# Patient Record
Sex: FEMALE | Race: BLACK OR AFRICAN AMERICAN | NOT HISPANIC OR LATINO | URBAN - METROPOLITAN AREA
[De-identification: names, ages, dates, MRNs, and addresses within clinical notes are randomized per-mention and may not be internally consistent; named-entity substitution may affect disease eponyms.]

---

## 2020-10-30 ENCOUNTER — OFFICE VISIT (OUTPATIENT)
Dept: URBAN - METROPOLITAN AREA CLINIC 84 | Facility: CLINIC | Age: 70
End: 2020-10-30
Payer: COMMERCIAL

## 2020-10-30 DIAGNOSIS — K58.9 IBS (IRRITABLE BOWEL SYNDROME)-C: ICD-10-CM

## 2020-10-30 DIAGNOSIS — R13.10 DYSPHAGIA: ICD-10-CM

## 2020-10-30 DIAGNOSIS — K22.2 ESOPHAGEAL STRICTURE: ICD-10-CM

## 2020-10-30 PROCEDURE — G9903 PT SCRN TBCO ID AS NON USER: HCPCS | Performed by: INTERNAL MEDICINE

## 2020-10-30 PROCEDURE — G8427 DOCREV CUR MEDS BY ELIG CLIN: HCPCS | Performed by: INTERNAL MEDICINE

## 2020-10-30 PROCEDURE — G8417 CALC BMI ABV UP PARAM F/U: HCPCS | Performed by: INTERNAL MEDICINE

## 2020-10-30 PROCEDURE — 99214 OFFICE O/P EST MOD 30 MIN: CPT | Performed by: INTERNAL MEDICINE

## 2020-10-30 PROCEDURE — 3017F COLORECTAL CA SCREEN DOC REV: CPT | Performed by: INTERNAL MEDICINE

## 2020-10-30 PROCEDURE — G8483 FLU IMM NO ADMIN DOC REA: HCPCS | Performed by: INTERNAL MEDICINE

## 2020-10-30 RX ORDER — LATANOPROST/PF 0.005 %
INSTILL 1 DROP INTO AFFECTED EYE(S) BY OPHTHALMIC ROUTE ONCE DAILY IN THE EVENING DROPS OPHTHALMIC (EYE) 1
Qty: 1 | Refills: 0 | Status: ACTIVE | COMMUNITY
Start: 1900-01-01 | End: 1900-01-01

## 2020-10-30 RX ORDER — LINACLOTIDE 145 UG/1
1 CAPSULE AT LEAST 30 MINUTES BEFORE THE FIRST MEAL OF THE DAY ON AN EMPTY STOMACH CAPSULE, GELATIN COATED ORAL ONCE A DAY
Qty: 90 | Refills: 3 | OUTPATIENT
Start: 2020-10-30 | End: 2021-10-25

## 2020-10-30 RX ORDER — CHLORHEXIDINE GLUCONATE 4 %
LIQUID (ML) TOPICAL
Qty: 0 | Refills: 0 | Status: ACTIVE | COMMUNITY
Start: 1900-01-01 | End: 1900-01-01

## 2020-10-30 RX ORDER — POTASSIUM CHLORIDE 750 MG/1
TAKE 1 CAPSULE (10 MEQ) BY ORAL ROUTE ONCE DAILY WITH FOOD CAPSULE, EXTENDED RELEASE ORAL 1
Qty: 0 | Refills: 0 | Status: ACTIVE | COMMUNITY
Start: 1900-01-01 | End: 1900-01-01

## 2020-10-30 RX ORDER — ESCITALOPRAM 10 MG/1
TABLET, FILM COATED ORAL
Qty: 0 | Refills: 0 | Status: ACTIVE | COMMUNITY
Start: 1900-01-01 | End: 1900-01-01

## 2020-10-30 RX ORDER — PANTOPRAZOLE SODIUM 40 MG/1
TABLET, DELAYED RELEASE ORAL
Qty: 0 | Refills: 0 | Status: ACTIVE | COMMUNITY
Start: 1900-01-01 | End: 1900-01-01

## 2020-10-30 RX ORDER — DICLOFENAC SODIUM 1 MG/ML
SOLUTION OPHTHALMIC
Qty: 0 | Refills: 0 | Status: ACTIVE | COMMUNITY
Start: 1900-01-01 | End: 1900-01-01

## 2020-10-30 RX ORDER — NYSTATIN 100000 [USP'U]/G
CREAM TOPICAL
Qty: 0 | Refills: 0 | Status: ACTIVE | COMMUNITY
Start: 1900-01-01 | End: 1900-01-01

## 2020-10-30 NOTE — HPI-TODAY'S VISIT:
Patient last seen about 1.5 years ago.  There has been no change in her PMHx/PSHx.  She did have her PPM was changed to a PPM/AICD. Her Cardiologist is Dr. Ying.  She is not on any anticoagulation therapy.  She was feeling well until 3 months ago.  She developed dysphagia.  She had a lot of post prandial cough and "strangling."  She feels like it takes a long time for her pills and solid food to go down.  She has an epigastric pain when the food hits her stomach.  She has nausea but no vomiting on a regular basis.  She denies heartburn.  She is on Protonix .  She was seen by ENT for her sense of "strangling" sensation.  She saw Julia Fahrney, AuD.  According to the patient she had a  SLP evaluation.  According to the patient, she was told that her esophagus is narrow.  They were recommending an EGD.  She has a decreased appetite but denies weight loss.  She has 2 BM's a day.  She has a lot of cramping when she has a B M.  She has a lot of nausea and dizziness after a BM.  She denies LGI bleed or melena.  She has strain and incomplete evacuation.  She is on Metamucil and Miralax.  She has not been on Linzess/Amitiza/Trulance.  As far as she knows she does not have CHF

## 2020-11-03 PROBLEM — 63305008 ESOPHAGEAL STRICTURE: Status: ACTIVE | Noted: 2020-10-30

## 2020-11-18 ENCOUNTER — TELEPHONE ENCOUNTER (OUTPATIENT)
Dept: URBAN - METROPOLITAN AREA CLINIC 92 | Facility: CLINIC | Age: 70
End: 2020-11-18

## 2020-11-18 ENCOUNTER — CLAIMS CREATED FROM THE CLAIM WINDOW (OUTPATIENT)
Dept: URBAN - METROPOLITAN AREA CLINIC 4 | Facility: CLINIC | Age: 70
End: 2020-11-18
Payer: COMMERCIAL

## 2020-11-18 ENCOUNTER — OFFICE VISIT (OUTPATIENT)
Dept: URBAN - METROPOLITAN AREA SURGERY CENTER 20 | Facility: SURGERY CENTER | Age: 70
End: 2020-11-18
Payer: COMMERCIAL

## 2020-11-18 DIAGNOSIS — K21.00 GASTRO-ESOPHAGEAL REFLUX DISEASE WITH ESOPHAGITIS, WITHOUT BLEEDING: ICD-10-CM

## 2020-11-18 DIAGNOSIS — R13.10 ABNORMAL SWALLOWING: ICD-10-CM

## 2020-11-18 DIAGNOSIS — K21.9 ACID REFLUX: ICD-10-CM

## 2020-11-18 DIAGNOSIS — Z98.84 BARIATRIC SURG STAT-UNSP: ICD-10-CM

## 2020-11-18 DIAGNOSIS — K31.89 OTHER DISEASES OF STOMACH AND DUODENUM: ICD-10-CM

## 2020-11-18 PROCEDURE — 88312 SPECIAL STAINS GROUP 1: CPT | Performed by: PATHOLOGY

## 2020-11-18 PROCEDURE — G8907 PT DOC NO EVENTS ON DISCHARG: HCPCS | Performed by: INTERNAL MEDICINE

## 2020-11-18 PROCEDURE — 43239 EGD BIOPSY SINGLE/MULTIPLE: CPT | Performed by: INTERNAL MEDICINE

## 2020-11-18 PROCEDURE — 88305 TISSUE EXAM BY PATHOLOGIST: CPT | Performed by: PATHOLOGY

## 2020-11-18 RX ORDER — POTASSIUM CHLORIDE 750 MG/1
TAKE 1 CAPSULE (10 MEQ) BY ORAL ROUTE ONCE DAILY WITH FOOD CAPSULE, EXTENDED RELEASE ORAL 1
Qty: 0 | Refills: 0 | Status: ACTIVE | COMMUNITY
Start: 1900-01-01 | End: 1900-01-01

## 2020-11-18 RX ORDER — NYSTATIN 100000 [USP'U]/G
CREAM TOPICAL
Qty: 0 | Refills: 0 | Status: ACTIVE | COMMUNITY
Start: 1900-01-01 | End: 1900-01-01

## 2020-11-18 RX ORDER — ESCITALOPRAM 10 MG/1
TABLET, FILM COATED ORAL
Qty: 0 | Refills: 0 | Status: ACTIVE | COMMUNITY
Start: 1900-01-01 | End: 1900-01-01

## 2020-11-18 RX ORDER — PANTOPRAZOLE SODIUM 40 MG/1
TABLET, DELAYED RELEASE ORAL
Qty: 0 | Refills: 0 | Status: ACTIVE | COMMUNITY
Start: 1900-01-01 | End: 1900-01-01

## 2020-11-18 RX ORDER — CHLORHEXIDINE GLUCONATE 4 %
LIQUID (ML) TOPICAL
Qty: 0 | Refills: 0 | Status: ACTIVE | COMMUNITY
Start: 1900-01-01 | End: 1900-01-01

## 2020-11-18 RX ORDER — LINACLOTIDE 145 UG/1
1 CAPSULE AT LEAST 30 MINUTES BEFORE THE FIRST MEAL OF THE DAY ON AN EMPTY STOMACH CAPSULE, GELATIN COATED ORAL ONCE A DAY
Qty: 90 | Refills: 3 | Status: ACTIVE | COMMUNITY
Start: 2020-10-30 | End: 2021-10-25

## 2020-11-18 RX ORDER — DICLOFENAC SODIUM 1 MG/ML
SOLUTION OPHTHALMIC
Qty: 0 | Refills: 0 | Status: ACTIVE | COMMUNITY
Start: 1900-01-01 | End: 1900-01-01

## 2020-11-18 RX ORDER — LATANOPROST/PF 0.005 %
INSTILL 1 DROP INTO AFFECTED EYE(S) BY OPHTHALMIC ROUTE ONCE DAILY IN THE EVENING DROPS OPHTHALMIC (EYE) 1
Qty: 1 | Refills: 0 | Status: ACTIVE | COMMUNITY
Start: 1900-01-01 | End: 1900-01-01

## 2021-04-22 ENCOUNTER — OFFICE VISIT (OUTPATIENT)
Dept: URBAN - METROPOLITAN AREA CLINIC 84 | Facility: CLINIC | Age: 71
End: 2021-04-22

## 2022-03-16 ENCOUNTER — OFFICE VISIT (OUTPATIENT)
Dept: URBAN - METROPOLITAN AREA CLINIC 29 | Facility: CLINIC | Age: 72
End: 2022-03-16

## 2022-03-16 PROBLEM — 275978004 SCREENING FOR MALIGNANT NEOPLASM OF COLON: Status: ACTIVE | Noted: 2022-03-16

## 2022-03-16 PROBLEM — 14760008 CONSTIPATION: Status: ACTIVE | Noted: 2022-03-16

## 2022-03-16 PROBLEM — 79922009 EPIGASTRIC PAIN: Status: ACTIVE | Noted: 2022-03-16

## 2022-03-16 PROBLEM — 301717006 RIGHT UPPER QUADRANT PAIN: Status: ACTIVE | Noted: 2022-03-16

## 2022-04-06 ENCOUNTER — OFFICE VISIT (OUTPATIENT)
Dept: URBAN - METROPOLITAN AREA MEDICAL CENTER 17 | Facility: MEDICAL CENTER | Age: 72
End: 2022-04-06

## 2022-04-27 ENCOUNTER — OFFICE VISIT (OUTPATIENT)
Dept: URBAN - METROPOLITAN AREA MEDICAL CENTER 17 | Facility: MEDICAL CENTER | Age: 72
End: 2022-04-27

## 2022-04-27 ENCOUNTER — OUT OF OFFICE VISIT (OUTPATIENT)
Dept: URBAN - METROPOLITAN AREA MEDICAL CENTER 17 | Facility: MEDICAL CENTER | Age: 72
End: 2022-04-27
Payer: COMMERCIAL

## 2022-04-27 DIAGNOSIS — Z98.84 BARIATRIC SURG STAT-UNSP: ICD-10-CM

## 2022-04-27 DIAGNOSIS — R10.13 ABDOMINAL DISCOMFORT, EPIGASTRIC: ICD-10-CM

## 2022-04-27 PROCEDURE — 43239 EGD BIOPSY SINGLE/MULTIPLE: CPT | Performed by: INTERNAL MEDICINE

## 2022-04-30 ENCOUNTER — TELEPHONE ENCOUNTER (OUTPATIENT)
Dept: URBAN - METROPOLITAN AREA CLINIC 121 | Facility: CLINIC | Age: 72
End: 2022-04-30

## 2022-05-01 ENCOUNTER — TELEPHONE ENCOUNTER (OUTPATIENT)
Dept: URBAN - METROPOLITAN AREA CLINIC 121 | Facility: CLINIC | Age: 72
End: 2022-05-01

## 2022-05-01 RX ORDER — SUCRALFATE 1 G/1
TAKE 1 TABLET BY MOUTH FOUR TIMES A DAY WITH A GLASS OF WATER DISSOLVE ONE TAB IN 10ML OF WATER BEFORE MEALS AND DRINK TABLET ORAL
Status: ACTIVE | COMMUNITY
Start: 2022-04-27

## 2022-05-01 RX ORDER — SODIUM SULFATE, POTASSIUM SULFATE, MAGNESIUM SULFATE 17.5; 3.13; 1.6 G/ML; G/ML; G/ML
TAKE AS DIRECTED MIX AND DRINK AS DIRECTED FOR COLONOSCOPY PREP SOLUTION, CONCENTRATE ORAL
Status: ACTIVE | COMMUNITY
Start: 2022-03-24

## 2022-05-16 ENCOUNTER — OFFICE VISIT (OUTPATIENT)
Dept: URBAN - METROPOLITAN AREA CLINIC 29 | Facility: CLINIC | Age: 72
End: 2022-05-16
Payer: COMMERCIAL

## 2022-05-16 VITALS
WEIGHT: 254 LBS | BODY MASS INDEX: 43.36 KG/M2 | TEMPERATURE: 97.5 F | HEIGHT: 64 IN | DIASTOLIC BLOOD PRESSURE: 78 MMHG | SYSTOLIC BLOOD PRESSURE: 140 MMHG | HEART RATE: 60 BPM

## 2022-05-16 DIAGNOSIS — R11.0 NAUSEA: ICD-10-CM

## 2022-05-16 PROCEDURE — 99213 OFFICE O/P EST LOW 20 MIN: CPT | Performed by: INTERNAL MEDICINE

## 2022-05-16 RX ORDER — ESCITALOPRAM 10 MG/1
TABLET, FILM COATED ORAL
Qty: 0 | Refills: 0 | Status: ACTIVE | COMMUNITY
Start: 1900-01-01 | End: 1900-01-01

## 2022-05-16 RX ORDER — CHLORHEXIDINE GLUCONATE 4 %
LIQUID (ML) TOPICAL
Qty: 0 | Refills: 0 | Status: ACTIVE | COMMUNITY
Start: 1900-01-01 | End: 1900-01-01

## 2022-05-16 RX ORDER — LATANOPROST/PF 0.005 %
INSTILL 1 DROP INTO AFFECTED EYE(S) BY OPHTHALMIC ROUTE ONCE DAILY IN THE EVENING DROPS OPHTHALMIC (EYE) 1
Qty: 1 | Refills: 0 | Status: ACTIVE | COMMUNITY
Start: 1900-01-01 | End: 1900-01-01

## 2022-05-16 RX ORDER — DICLOFENAC SODIUM 1 MG/ML
SOLUTION OPHTHALMIC
Qty: 0 | Refills: 0 | Status: ACTIVE | COMMUNITY
Start: 1900-01-01 | End: 1900-01-01

## 2022-05-16 RX ORDER — SUCRALFATE 1 G/1
TAKE 1 TABLET BY MOUTH FOUR TIMES A DAY WITH A GLASS OF WATER DISSOLVE ONE TAB IN 10ML OF WATER BEFORE MEALS AND DRINK TABLET ORAL
Status: ACTIVE | COMMUNITY
Start: 2022-04-27

## 2022-05-16 RX ORDER — PANTOPRAZOLE SODIUM 40 MG/1
TABLET, DELAYED RELEASE ORAL
Qty: 0 | Refills: 0 | Status: ACTIVE | COMMUNITY
Start: 1900-01-01 | End: 1900-01-01

## 2022-05-16 RX ORDER — SODIUM SULFATE, POTASSIUM SULFATE, MAGNESIUM SULFATE 17.5; 3.13; 1.6 G/ML; G/ML; G/ML
TAKE AS DIRECTED MIX AND DRINK AS DIRECTED FOR COLONOSCOPY PREP SOLUTION, CONCENTRATE ORAL
Status: ACTIVE | COMMUNITY
Start: 2022-03-24

## 2022-05-16 RX ORDER — NYSTATIN 100000 [USP'U]/G
CREAM TOPICAL
Qty: 0 | Refills: 0 | Status: ACTIVE | COMMUNITY
Start: 1900-01-01 | End: 1900-01-01

## 2022-05-16 RX ORDER — POTASSIUM CHLORIDE 750 MG/1
TAKE 1 CAPSULE (10 MEQ) BY ORAL ROUTE ONCE DAILY WITH FOOD CAPSULE, EXTENDED RELEASE ORAL 1
Qty: 0 | Refills: 0 | Status: ACTIVE | COMMUNITY
Start: 1900-01-01 | End: 1900-01-01

## 2022-05-16 NOTE — HPI-TODAY'S VISIT:
Ms. Fisher is a 71 y/o W with chronic dyspepsia,  She states that the omeprazole had improved her symptoms, but she still has nausea.  She states it's about 6/10.   She states she takes Carafate twice a day.

## 2022-08-12 ENCOUNTER — WEB ENCOUNTER (OUTPATIENT)
Dept: URBAN - METROPOLITAN AREA CLINIC 84 | Facility: CLINIC | Age: 72
End: 2022-08-12

## 2022-08-12 ENCOUNTER — OFFICE VISIT (OUTPATIENT)
Dept: URBAN - METROPOLITAN AREA CLINIC 84 | Facility: CLINIC | Age: 72
End: 2022-08-12
Payer: COMMERCIAL

## 2022-08-12 VITALS
SYSTOLIC BLOOD PRESSURE: 101 MMHG | HEIGHT: 64 IN | BODY MASS INDEX: 41.35 KG/M2 | DIASTOLIC BLOOD PRESSURE: 64 MMHG | TEMPERATURE: 97.9 F | WEIGHT: 242.2 LBS | HEART RATE: 64 BPM

## 2022-08-12 DIAGNOSIS — K58.9 IBS (IRRITABLE BOWEL SYNDROME)-C: ICD-10-CM

## 2022-08-12 DIAGNOSIS — R10.13 EPIGASTRIC ABDOMINAL PAIN: ICD-10-CM

## 2022-08-12 DIAGNOSIS — R11.0 NAUSEA: ICD-10-CM

## 2022-08-12 PROCEDURE — 99213 OFFICE O/P EST LOW 20 MIN: CPT | Performed by: INTERNAL MEDICINE

## 2022-08-12 RX ORDER — LATANOPROST/PF 0.005 %
INSTILL 1 DROP INTO AFFECTED EYE(S) BY OPHTHALMIC ROUTE ONCE DAILY IN THE EVENING DROPS OPHTHALMIC (EYE) 1
Qty: 1 | Refills: 0 | Status: ACTIVE | COMMUNITY
Start: 1900-01-01

## 2022-08-12 RX ORDER — PANTOPRAZOLE SODIUM 40 MG/1
TABLET, DELAYED RELEASE ORAL
Qty: 0 | Refills: 0 | Status: ACTIVE | COMMUNITY
Start: 1900-01-01

## 2022-08-12 RX ORDER — DICLOFENAC SODIUM 1 MG/ML
SOLUTION OPHTHALMIC
Qty: 0 | Refills: 0 | Status: ACTIVE | COMMUNITY
Start: 1900-01-01

## 2022-08-12 RX ORDER — CHLORHEXIDINE GLUCONATE 4 %
LIQUID (ML) TOPICAL
Qty: 0 | Refills: 0 | Status: ACTIVE | COMMUNITY
Start: 1900-01-01

## 2022-08-12 RX ORDER — SUCRALFATE 1 G/1
TAKE 1 TABLET BY MOUTH FOUR TIMES A DAY WITH A GLASS OF WATER DISSOLVE ONE TAB IN 10ML OF WATER BEFORE MEALS AND DRINK TABLET ORAL
Status: ACTIVE | COMMUNITY
Start: 2022-04-27

## 2022-08-12 RX ORDER — ESCITALOPRAM 10 MG/1
TABLET, FILM COATED ORAL
Qty: 0 | Refills: 0 | Status: ACTIVE | COMMUNITY
Start: 1900-01-01

## 2022-08-12 RX ORDER — PROMETHAZINE HYDROCHLORIDE 25 MG/1
1 TABLET AS NEEDED TABLET ORAL
Qty: 40 | Refills: 2 | OUTPATIENT
Start: 2022-08-12 | End: 2022-11-09

## 2022-08-12 RX ORDER — SODIUM SULFATE, POTASSIUM SULFATE, MAGNESIUM SULFATE 17.5; 3.13; 1.6 G/ML; G/ML; G/ML
TAKE AS DIRECTED MIX AND DRINK AS DIRECTED FOR COLONOSCOPY PREP SOLUTION, CONCENTRATE ORAL
Status: ACTIVE | COMMUNITY
Start: 2022-03-24

## 2022-08-12 RX ORDER — POTASSIUM CHLORIDE 750 MG/1
TAKE 1 CAPSULE (10 MEQ) BY ORAL ROUTE ONCE DAILY WITH FOOD CAPSULE, EXTENDED RELEASE ORAL 1
Qty: 0 | Refills: 0 | Status: ACTIVE | COMMUNITY
Start: 1900-01-01

## 2022-08-12 RX ORDER — NYSTATIN 100000 [USP'U]/G
CREAM TOPICAL
Qty: 0 | Refills: 0 | Status: ACTIVE | COMMUNITY
Start: 1900-01-01

## 2022-08-12 NOTE — HPI-TODAY'S VISIT:
Walter last seen by me in 2020.  EGD was overall nrmal.  She developed abdominal paina dn saw Dr. Gonzales.  EGD ahd gastritis and colonocsoph had multiple small TA's with a 3 year recall.  She was started on Omeprazole without relief.  She has constant N/V and an upper abdominal pain.  She has been having the symptoms for a few months.  She has tried to change her diet.  She thinks that she had a CT Scan.  She has a poor appetite and she ahs been losing weight.  She denies GERD.  She does have a vague chest pain.  She saw Dr. Ying from Cardiology last week.  She denies dysphagia.  She has nausea even if she doesn't eat.  She does feel a little better with Carafate.  She did not realy respond to Zofran.  She has been having regular BM's.  She uses Miralax and eats prunes.  She denies LGI bleed or melena.

## 2022-08-15 ENCOUNTER — TELEPHONE ENCOUNTER (OUTPATIENT)
Dept: URBAN - METROPOLITAN AREA CLINIC 84 | Facility: CLINIC | Age: 72
End: 2022-08-15

## 2022-08-18 ENCOUNTER — TELEPHONE ENCOUNTER (OUTPATIENT)
Dept: URBAN - METROPOLITAN AREA CLINIC 84 | Facility: CLINIC | Age: 72
End: 2022-08-18

## 2022-08-25 ENCOUNTER — OFFICE VISIT (OUTPATIENT)
Dept: URBAN - METROPOLITAN AREA CLINIC 84 | Facility: CLINIC | Age: 72
End: 2022-08-25

## 2022-10-07 ENCOUNTER — OFFICE VISIT (OUTPATIENT)
Dept: URBAN - METROPOLITAN AREA CLINIC 84 | Facility: CLINIC | Age: 72
End: 2022-10-07
Payer: COMMERCIAL

## 2022-10-07 VITALS
SYSTOLIC BLOOD PRESSURE: 112 MMHG | TEMPERATURE: 96.6 F | WEIGHT: 245.2 LBS | HEIGHT: 64 IN | BODY MASS INDEX: 41.86 KG/M2 | DIASTOLIC BLOOD PRESSURE: 68 MMHG | HEART RATE: 60 BPM

## 2022-10-07 DIAGNOSIS — R13.10 DYSPHAGIA: ICD-10-CM

## 2022-10-07 DIAGNOSIS — R10.13 EPIGASTRIC ABDOMINAL PAIN: ICD-10-CM

## 2022-10-07 DIAGNOSIS — K21.00 GASTRO-ESOPHAGEAL REFLUX DISEASE WITH ESOPHAGITIS, WITHOUT BLEEDING: ICD-10-CM

## 2022-10-07 DIAGNOSIS — R11.0 NAUSEA: ICD-10-CM

## 2022-10-07 DIAGNOSIS — K62.9 DISEASE OF ANUS AND RECTUM, UNSPECIFIED: ICD-10-CM

## 2022-10-07 DIAGNOSIS — K58.9 IBS (IRRITABLE BOWEL SYNDROME)-C: ICD-10-CM

## 2022-10-07 DIAGNOSIS — R68.81 EARLY SATIETY: ICD-10-CM

## 2022-10-07 DIAGNOSIS — R15.9 FULL INCONTINENCE OF FECES: ICD-10-CM

## 2022-10-07 PROCEDURE — 99213 OFFICE O/P EST LOW 20 MIN: CPT | Performed by: INTERNAL MEDICINE

## 2022-10-07 RX ORDER — SODIUM SULFATE, POTASSIUM SULFATE, MAGNESIUM SULFATE 17.5; 3.13; 1.6 G/ML; G/ML; G/ML
TAKE AS DIRECTED MIX AND DRINK AS DIRECTED FOR COLONOSCOPY PREP SOLUTION, CONCENTRATE ORAL
Status: ON HOLD | COMMUNITY
Start: 2022-03-24

## 2022-10-07 RX ORDER — PANTOPRAZOLE SODIUM 40 MG/1
TABLET, DELAYED RELEASE ORAL
Qty: 0 | Refills: 0 | Status: ACTIVE | COMMUNITY
Start: 1900-01-01

## 2022-10-07 RX ORDER — CHLORHEXIDINE GLUCONATE 4 %
LIQUID (ML) TOPICAL
Qty: 0 | Refills: 0 | Status: ON HOLD | COMMUNITY
Start: 1900-01-01

## 2022-10-07 RX ORDER — ESCITALOPRAM 10 MG/1
TABLET, FILM COATED ORAL
Qty: 0 | Refills: 0 | Status: ON HOLD | COMMUNITY
Start: 1900-01-01

## 2022-10-07 RX ORDER — SUCRALFATE 1 G/1
TAKE 1 TABLET BY MOUTH FOUR TIMES A DAY WITH A GLASS OF WATER DISSOLVE ONE TAB IN 10ML OF WATER BEFORE MEALS AND DRINK TABLET ORAL
Status: ACTIVE | COMMUNITY
Start: 2022-04-27

## 2022-10-07 RX ORDER — LATANOPROST/PF 0.005 %
INSTILL 1 DROP INTO AFFECTED EYE(S) BY OPHTHALMIC ROUTE ONCE DAILY IN THE EVENING DROPS OPHTHALMIC (EYE) 1
Qty: 1 | Refills: 0 | Status: ACTIVE | COMMUNITY
Start: 1900-01-01

## 2022-10-07 RX ORDER — PROMETHAZINE HYDROCHLORIDE 25 MG/1
1 TABLET AS NEEDED TABLET ORAL
Qty: 40 | Refills: 2 | Status: ON HOLD | COMMUNITY
Start: 2022-08-12 | End: 2022-11-09

## 2022-10-07 RX ORDER — POTASSIUM CHLORIDE 750 MG/1
TAKE 1 CAPSULE (10 MEQ) BY ORAL ROUTE ONCE DAILY WITH FOOD CAPSULE, EXTENDED RELEASE ORAL 1
Qty: 0 | Refills: 0 | Status: ACTIVE | COMMUNITY
Start: 1900-01-01

## 2022-10-07 RX ORDER — DICLOFENAC SODIUM 1 MG/ML
SOLUTION OPHTHALMIC
Qty: 0 | Refills: 0 | Status: ACTIVE | COMMUNITY
Start: 1900-01-01

## 2022-10-07 RX ORDER — NYSTATIN 100000 [USP'U]/G
CREAM TOPICAL
Qty: 0 | Refills: 0 | Status: ON HOLD | COMMUNITY
Start: 1900-01-01

## 2022-10-07 NOTE — HPI-TODAY'S VISIT:
SBFT was normal.  She still has the nausea.  It is controlled on the Phenergan PRN.  She spoke to her [place] doctors about her polypharmacy but they haven't made any changes.  She did stop her seizure medication.  She has occasional abdominal pain.  Her appetite is OK.  SHe denies weight loss.  She denies vomiting.  She has early satiety.  She sometimes feels like her pills gets stuck in her throat.  She has irregular BM's.  SHe has ahd some fecal seepage.  SHe denies LGI Bleed or melena

## 2022-11-04 ENCOUNTER — OFFICE VISIT (OUTPATIENT)
Dept: URBAN - METROPOLITAN AREA CLINIC 84 | Facility: CLINIC | Age: 72
End: 2022-11-04
Payer: COMMERCIAL

## 2022-11-04 VITALS
DIASTOLIC BLOOD PRESSURE: 76 MMHG | BODY MASS INDEX: 40.36 KG/M2 | WEIGHT: 236.4 LBS | TEMPERATURE: 97.3 F | SYSTOLIC BLOOD PRESSURE: 119 MMHG | HEIGHT: 64 IN | HEART RATE: 60 BPM

## 2022-11-04 DIAGNOSIS — R11.0 NAUSEA: ICD-10-CM

## 2022-11-04 DIAGNOSIS — K58.9 IBS (IRRITABLE BOWEL SYNDROME)-C: ICD-10-CM

## 2022-11-04 DIAGNOSIS — K21.00 GASTRO-ESOPHAGEAL REFLUX DISEASE WITH ESOPHAGITIS, WITHOUT BLEEDING: ICD-10-CM

## 2022-11-04 DIAGNOSIS — K31.84 GASTROPARESIS: ICD-10-CM

## 2022-11-04 PROCEDURE — 99213 OFFICE O/P EST LOW 20 MIN: CPT | Performed by: INTERNAL MEDICINE

## 2022-11-04 RX ORDER — DICLOFENAC SODIUM 1 MG/ML
SOLUTION OPHTHALMIC
Qty: 0 | Refills: 0 | Status: ACTIVE | COMMUNITY
Start: 1900-01-01

## 2022-11-04 RX ORDER — CHLORHEXIDINE GLUCONATE 4 %
LIQUID (ML) TOPICAL
Qty: 0 | Refills: 0 | Status: ON HOLD | COMMUNITY
Start: 1900-01-01

## 2022-11-04 RX ORDER — SUCRALFATE 1 G/1
TAKE 1 TABLET BY MOUTH FOUR TIMES A DAY WITH A GLASS OF WATER DISSOLVE ONE TAB IN 10ML OF WATER BEFORE MEALS AND DRINK TABLET ORAL
Status: ACTIVE | COMMUNITY
Start: 2022-04-27

## 2022-11-04 RX ORDER — PROMETHAZINE HYDROCHLORIDE 25 MG/1
1 TABLET AS NEEDED TABLET ORAL
Qty: 40 | Refills: 2 | Status: ON HOLD | COMMUNITY
Start: 2022-08-12 | End: 2022-11-09

## 2022-11-04 RX ORDER — LATANOPROST/PF 0.005 %
INSTILL 1 DROP INTO AFFECTED EYE(S) BY OPHTHALMIC ROUTE ONCE DAILY IN THE EVENING DROPS OPHTHALMIC (EYE) 1
Qty: 1 | Refills: 0 | Status: ACTIVE | COMMUNITY
Start: 1900-01-01

## 2022-11-04 RX ORDER — ESCITALOPRAM 10 MG/1
TABLET, FILM COATED ORAL
Qty: 0 | Refills: 0 | Status: ON HOLD | COMMUNITY
Start: 1900-01-01

## 2022-11-04 RX ORDER — PANTOPRAZOLE SODIUM 40 MG/1
TABLET, DELAYED RELEASE ORAL
Qty: 0 | Refills: 0 | Status: ACTIVE | COMMUNITY
Start: 1900-01-01

## 2022-11-04 RX ORDER — POTASSIUM CHLORIDE 750 MG/1
TAKE 1 CAPSULE (10 MEQ) BY ORAL ROUTE ONCE DAILY WITH FOOD CAPSULE, EXTENDED RELEASE ORAL 1
Qty: 0 | Refills: 0 | Status: ACTIVE | COMMUNITY
Start: 1900-01-01

## 2022-11-04 RX ORDER — PROMETHAZINE HYDROCHLORIDE 25 MG/1
1 TABLET AS NEEDED TABLET ORAL
Qty: 60 | Refills: 5

## 2022-11-04 RX ORDER — SODIUM SULFATE, POTASSIUM SULFATE, MAGNESIUM SULFATE 17.5; 3.13; 1.6 G/ML; G/ML; G/ML
TAKE AS DIRECTED MIX AND DRINK AS DIRECTED FOR COLONOSCOPY PREP SOLUTION, CONCENTRATE ORAL
Status: ON HOLD | COMMUNITY
Start: 2022-03-24

## 2022-11-04 RX ORDER — NYSTATIN 100000 [USP'U]/G
CREAM TOPICAL
Qty: 0 | Refills: 0 | Status: ON HOLD | COMMUNITY
Start: 1900-01-01

## 2022-11-04 NOTE — HPI-TODAY'S VISIT:
Patient seen a month ago.  GES was done and I am awaiting results.  She feels a little better. She has changed her diet and she believes that this has been helpful.  She has a good appetite.  She does have some nausea after she eats.  She spoke to her doctors about the polypharmacy.  She uses Phenergan twice a day with good response.  She has mild abdominal pain.  She has early satiety.  She denies GERD or dysphagia.  She denies LGI symptoms.  She has ahd some fecal seepage.  She has been doing kegels.  REview of GES from 10/22 reveals delayed agstric emptying c/w gastroparesis.  Her DM has been well controlled.  She is not on narcotics.  She is on Tramadol

## 2022-11-14 PROBLEM — 235675006 GASTROPARESIS: Status: ACTIVE | Noted: 2022-11-04

## 2022-11-14 PROBLEM — 10743008 IRRITABLE BOWEL SYNDROME: Status: ACTIVE | Noted: 2020-10-30

## 2022-11-14 PROBLEM — 1086911000119107: Status: ACTIVE | Noted: 2022-10-07

## 2022-11-14 PROBLEM — 422587007 NAUSEA: Status: ACTIVE | Noted: 2022-03-16

## 2023-05-05 ENCOUNTER — OFFICE VISIT (OUTPATIENT)
Dept: URBAN - METROPOLITAN AREA CLINIC 84 | Facility: CLINIC | Age: 73
End: 2023-05-05

## 2023-05-05 ENCOUNTER — OFFICE VISIT (OUTPATIENT)
Dept: URBAN - METROPOLITAN AREA CLINIC 84 | Facility: CLINIC | Age: 73
End: 2023-05-05
Payer: COMMERCIAL

## 2023-05-05 VITALS
BODY MASS INDEX: 41.21 KG/M2 | TEMPERATURE: 98 F | DIASTOLIC BLOOD PRESSURE: 81 MMHG | HEIGHT: 64 IN | SYSTOLIC BLOOD PRESSURE: 158 MMHG | HEART RATE: 64 BPM | WEIGHT: 241.4 LBS

## 2023-05-05 DIAGNOSIS — R11.0 NAUSEA: ICD-10-CM

## 2023-05-05 DIAGNOSIS — K31.84 GASTROPARESIS: ICD-10-CM

## 2023-05-05 DIAGNOSIS — R15.9 FULL INCONTINENCE OF FECES: ICD-10-CM

## 2023-05-05 DIAGNOSIS — K58.9 IBS (IRRITABLE BOWEL SYNDROME)-C: ICD-10-CM

## 2023-05-05 PROCEDURE — 99213 OFFICE O/P EST LOW 20 MIN: CPT | Performed by: INTERNAL MEDICINE

## 2023-05-05 RX ORDER — PANTOPRAZOLE SODIUM 40 MG/1
TABLET, DELAYED RELEASE ORAL
Qty: 0 | Refills: 0 | COMMUNITY
Start: 1900-01-01

## 2023-05-05 RX ORDER — LATANOPROST/PF 0.005 %
INSTILL 1 DROP INTO AFFECTED EYE(S) BY OPHTHALMIC ROUTE ONCE DAILY IN THE EVENING DROPS OPHTHALMIC (EYE) 1
Qty: 1 | Refills: 0 | COMMUNITY
Start: 1900-01-01

## 2023-05-05 RX ORDER — NYSTATIN 100000 [USP'U]/G
CREAM TOPICAL
Qty: 0 | Refills: 0 | COMMUNITY
Start: 1900-01-01

## 2023-05-05 RX ORDER — ESCITALOPRAM 10 MG/1
TABLET, FILM COATED ORAL
Qty: 0 | Refills: 0 | COMMUNITY
Start: 1900-01-01

## 2023-05-05 RX ORDER — DICLOFENAC SODIUM 1 MG/ML
SOLUTION OPHTHALMIC
Qty: 0 | Refills: 0 | COMMUNITY
Start: 1900-01-01

## 2023-05-05 RX ORDER — POTASSIUM CHLORIDE 750 MG/1
TAKE 1 CAPSULE (10 MEQ) BY ORAL ROUTE ONCE DAILY WITH FOOD CAPSULE, EXTENDED RELEASE ORAL 1
Qty: 0 | Refills: 0 | COMMUNITY
Start: 1900-01-01

## 2023-05-05 RX ORDER — SUCRALFATE 1 G/1
TAKE 1 TABLET BY MOUTH FOUR TIMES A DAY WITH A GLASS OF WATER DISSOLVE ONE TAB IN 10ML OF WATER BEFORE MEALS AND DRINK TABLET ORAL
COMMUNITY
Start: 2022-04-27

## 2023-05-05 RX ORDER — CHLORHEXIDINE GLUCONATE 4 %
LIQUID (ML) TOPICAL
Qty: 0 | Refills: 0 | COMMUNITY
Start: 1900-01-01

## 2023-05-05 RX ORDER — PROMETHAZINE HYDROCHLORIDE 25 MG/1
1 TABLET AS NEEDED TABLET ORAL
Qty: 60 | Refills: 5
Start: 2022-08-12 | End: 2023-08-03

## 2023-05-05 RX ORDER — SODIUM SULFATE, POTASSIUM SULFATE, MAGNESIUM SULFATE 17.5; 3.13; 1.6 G/ML; G/ML; G/ML
TAKE AS DIRECTED MIX AND DRINK AS DIRECTED FOR COLONOSCOPY PREP SOLUTION, CONCENTRATE ORAL
COMMUNITY
Start: 2022-03-24

## 2023-05-05 RX ORDER — PROMETHAZINE HYDROCHLORIDE 25 MG/1
1 TABLET AS NEEDED TABLET ORAL
Qty: 60 | Refills: 5 | COMMUNITY

## 2023-05-05 NOTE — HPI-TODAY'S VISIT:
Patient seen about 6 months ago.  Overall she is doing well.  She uses Phenergan PRN about 2-3 times a week.  It is very helpful.  She takes it for her nausea.  She denies significant abdominal pain.  She ahs been adhering to the gastroappparesis diet.  She has early satiety.  Her appetite is good and she denies weight loss.  She does try to exercise a lot.  She does have a lot of fatigue.  She has intermittent constipation especially when she eats starch and cheese.  She has been doing kegels and she has not had any recurrent FI.  She denies LGI bleed or melena

## 2023-11-03 ENCOUNTER — P2P PATIENT RECORD (OUTPATIENT)
Age: 73
End: 2023-11-03

## 2023-11-04 ENCOUNTER — P2P PATIENT RECORD (OUTPATIENT)
Age: 73
End: 2023-11-04

## 2024-03-28 ENCOUNTER — OV EP (OUTPATIENT)
Dept: URBAN - METROPOLITAN AREA CLINIC 84 | Facility: CLINIC | Age: 74
End: 2024-03-28

## 2024-03-29 ENCOUNTER — OV EP (OUTPATIENT)
Dept: URBAN - METROPOLITAN AREA CLINIC 84 | Facility: CLINIC | Age: 74
End: 2024-03-29
Payer: COMMERCIAL

## 2024-03-29 VITALS
BODY MASS INDEX: 42.3 KG/M2 | HEART RATE: 60 BPM | WEIGHT: 247.8 LBS | HEIGHT: 64 IN | TEMPERATURE: 97.3 F | SYSTOLIC BLOOD PRESSURE: 112 MMHG | DIASTOLIC BLOOD PRESSURE: 69 MMHG

## 2024-03-29 DIAGNOSIS — Z86.010 PERSONAL HISTORY OF COLON POLYPS: ICD-10-CM

## 2024-03-29 DIAGNOSIS — R15.9 FULL INCONTINENCE OF FECES: ICD-10-CM

## 2024-03-29 DIAGNOSIS — Z95.810 STATUS POST IMPLANTATION OF AUTOMATIC CARDIOVERTER/DEFIBRILLATOR (AICD): ICD-10-CM

## 2024-03-29 DIAGNOSIS — R11.0 NAUSEA: ICD-10-CM

## 2024-03-29 DIAGNOSIS — R19.4 CHANGE IN BOWEL HABIT: ICD-10-CM

## 2024-03-29 DIAGNOSIS — K21.00 GASTRO-ESOPHAGEAL REFLUX DISEASE WITH ESOPHAGITIS, WITHOUT BLEEDING: ICD-10-CM

## 2024-03-29 DIAGNOSIS — K31.84 GASTROPARESIS: ICD-10-CM

## 2024-03-29 PROCEDURE — 99214 OFFICE O/P EST MOD 30 MIN: CPT | Performed by: INTERNAL MEDICINE

## 2024-03-29 RX ORDER — PANTOPRAZOLE SODIUM 40 MG/1
TABLET, DELAYED RELEASE ORAL
Qty: 0 | Refills: 0 | Status: ACTIVE | COMMUNITY
Start: 1900-01-01

## 2024-03-29 RX ORDER — CHLORHEXIDINE GLUCONATE 4 %
LIQUID (ML) TOPICAL
Qty: 0 | Refills: 0 | Status: ACTIVE | COMMUNITY
Start: 1900-01-01

## 2024-03-29 RX ORDER — POLYETHYLENE GLYCOL 3350, SODIUM CHLORIDE, SODIUM BICARBONATE, POTASSIUM CHLORIDE 420; 11.2; 5.72; 1.48 G/4L; G/4L; G/4L; G/4L
AS DIRECTED POWDER, FOR SOLUTION ORAL ONCE
Qty: 1 BOTTLE | Refills: 0 | OUTPATIENT
Start: 2024-03-29 | End: 2024-03-30

## 2024-03-29 RX ORDER — SODIUM SULFATE, POTASSIUM SULFATE, MAGNESIUM SULFATE 17.5; 3.13; 1.6 G/ML; G/ML; G/ML
TAKE AS DIRECTED MIX AND DRINK AS DIRECTED FOR COLONOSCOPY PREP SOLUTION, CONCENTRATE ORAL
Status: ACTIVE | COMMUNITY
Start: 2022-03-24

## 2024-03-29 RX ORDER — SUCRALFATE 1 G/1
TAKE 1 TABLET BY MOUTH FOUR TIMES A DAY WITH A GLASS OF WATER DISSOLVE ONE TAB IN 10ML OF WATER BEFORE MEALS AND DRINK TABLET ORAL
Status: ACTIVE | COMMUNITY
Start: 2022-04-27

## 2024-03-29 RX ORDER — DICLOFENAC SODIUM 1 MG/ML
SOLUTION OPHTHALMIC
Qty: 0 | Refills: 0 | Status: ACTIVE | COMMUNITY
Start: 1900-01-01

## 2024-03-29 RX ORDER — ESCITALOPRAM 10 MG/1
TABLET, FILM COATED ORAL
Qty: 0 | Refills: 0 | Status: ACTIVE | COMMUNITY
Start: 1900-01-01

## 2024-03-29 RX ORDER — NYSTATIN 100000 [USP'U]/G
CREAM TOPICAL
Qty: 0 | Refills: 0 | Status: ACTIVE | COMMUNITY
Start: 1900-01-01

## 2024-03-29 RX ORDER — POTASSIUM CHLORIDE 750 MG/1
TAKE 1 CAPSULE (10 MEQ) BY ORAL ROUTE ONCE DAILY WITH FOOD CAPSULE, EXTENDED RELEASE ORAL 1
Qty: 0 | Refills: 0 | Status: ACTIVE | COMMUNITY
Start: 1900-01-01

## 2024-03-29 RX ORDER — PROMETHAZINE HYDROCHLORIDE 25 MG/1
1 TABLET AS NEEDED TABLET ORAL
Qty: 60 | Refills: 5
Start: 2022-08-12 | End: 2024-09-25

## 2024-03-29 RX ORDER — LATANOPROST/PF 0.005 %
INSTILL 1 DROP INTO AFFECTED EYE(S) BY OPHTHALMIC ROUTE ONCE DAILY IN THE EVENING DROPS OPHTHALMIC (EYE) 1
Qty: 1 | Refills: 0 | Status: ACTIVE | COMMUNITY
Start: 1900-01-01

## 2024-03-29 NOTE — HPI-TODAY'S VISIT:
Patient last seen 5/2023.  She takes the Phenergan once a day.  Her nausea is well controlled on this.  She denies anroexia or weight loss.  She is trying to be compliant with the gastroparesis diet.  She fell in November and December.  When she fell she hit her head and stomach.  She saw Neurology and was told that she had a slight concussion.  She was having worse urinary and fecal incontinence after the fall.  She does not feel the stool leakage. She has to wear pull-ups.  She denies LGI Bleed or melena.  She has been doing Kegels.

## 2024-04-12 ENCOUNTER — COLON (OUTPATIENT)
Dept: URBAN - METROPOLITAN AREA MEDICAL CENTER 17 | Facility: MEDICAL CENTER | Age: 74
End: 2024-04-12
Payer: COMMERCIAL

## 2024-04-12 DIAGNOSIS — R19.4 ALTERATION IN BOWEL ELIMINATION: ICD-10-CM

## 2024-04-12 PROCEDURE — 45378 DIAGNOSTIC COLONOSCOPY: CPT | Performed by: INTERNAL MEDICINE

## 2024-04-12 RX ORDER — POTASSIUM CHLORIDE 750 MG/1
TAKE 1 CAPSULE (10 MEQ) BY ORAL ROUTE ONCE DAILY WITH FOOD CAPSULE, EXTENDED RELEASE ORAL 1
Qty: 0 | Refills: 0 | Status: ACTIVE | COMMUNITY
Start: 1900-01-01

## 2024-04-12 RX ORDER — SODIUM SULFATE, POTASSIUM SULFATE, MAGNESIUM SULFATE 17.5; 3.13; 1.6 G/ML; G/ML; G/ML
TAKE AS DIRECTED MIX AND DRINK AS DIRECTED FOR COLONOSCOPY PREP SOLUTION, CONCENTRATE ORAL
Status: ACTIVE | COMMUNITY
Start: 2022-03-24

## 2024-04-12 RX ORDER — DICLOFENAC SODIUM 1 MG/ML
SOLUTION OPHTHALMIC
Qty: 0 | Refills: 0 | Status: ACTIVE | COMMUNITY
Start: 1900-01-01

## 2024-04-12 RX ORDER — PANTOPRAZOLE SODIUM 40 MG/1
TABLET, DELAYED RELEASE ORAL
Qty: 0 | Refills: 0 | Status: ACTIVE | COMMUNITY
Start: 1900-01-01

## 2024-04-12 RX ORDER — NYSTATIN 100000 [USP'U]/G
CREAM TOPICAL
Qty: 0 | Refills: 0 | Status: ACTIVE | COMMUNITY
Start: 1900-01-01

## 2024-04-12 RX ORDER — SUCRALFATE 1 G/1
TAKE 1 TABLET BY MOUTH FOUR TIMES A DAY WITH A GLASS OF WATER DISSOLVE ONE TAB IN 10ML OF WATER BEFORE MEALS AND DRINK TABLET ORAL
Status: ACTIVE | COMMUNITY
Start: 2022-04-27

## 2024-04-12 RX ORDER — LATANOPROST/PF 0.005 %
INSTILL 1 DROP INTO AFFECTED EYE(S) BY OPHTHALMIC ROUTE ONCE DAILY IN THE EVENING DROPS OPHTHALMIC (EYE) 1
Qty: 1 | Refills: 0 | Status: ACTIVE | COMMUNITY
Start: 1900-01-01

## 2024-04-12 RX ORDER — ESCITALOPRAM 10 MG/1
TABLET, FILM COATED ORAL
Qty: 0 | Refills: 0 | Status: ACTIVE | COMMUNITY
Start: 1900-01-01

## 2024-04-12 RX ORDER — PROMETHAZINE HYDROCHLORIDE 25 MG/1
1 TABLET AS NEEDED TABLET ORAL
Qty: 60 | Refills: 5 | Status: ACTIVE | COMMUNITY
Start: 2022-08-12 | End: 2024-09-25

## 2024-04-12 RX ORDER — CHLORHEXIDINE GLUCONATE 4 %
LIQUID (ML) TOPICAL
Qty: 0 | Refills: 0 | Status: ACTIVE | COMMUNITY
Start: 1900-01-01

## 2024-05-09 ENCOUNTER — OFFICE VISIT (OUTPATIENT)
Dept: URBAN - METROPOLITAN AREA CLINIC 84 | Facility: CLINIC | Age: 74
End: 2024-05-09

## 2024-09-15 ENCOUNTER — P2P PATIENT RECORD (OUTPATIENT)
Age: 74
End: 2024-09-15

## 2024-09-16 ENCOUNTER — P2P PATIENT RECORD (OUTPATIENT)
Age: 74
End: 2024-09-16

## 2025-02-24 ENCOUNTER — P2P PATIENT RECORD (OUTPATIENT)
Age: 75
End: 2025-02-24

## 2025-03-04 ENCOUNTER — OFFICE VISIT (OUTPATIENT)
Dept: URBAN - METROPOLITAN AREA CLINIC 84 | Facility: CLINIC | Age: 75
End: 2025-03-04

## 2025-03-04 RX ORDER — SODIUM SULFATE, POTASSIUM SULFATE, MAGNESIUM SULFATE 17.5; 3.13; 1.6 G/ML; G/ML; G/ML
TAKE AS DIRECTED MIX AND DRINK AS DIRECTED FOR COLONOSCOPY PREP SOLUTION, CONCENTRATE ORAL
Status: ACTIVE | COMMUNITY
Start: 2022-03-24

## 2025-03-04 RX ORDER — DICLOFENAC SODIUM 1 MG/ML
SOLUTION OPHTHALMIC
Qty: 0 | Refills: 0 | Status: ACTIVE | COMMUNITY
Start: 1900-01-01

## 2025-03-04 RX ORDER — LATANOPROST/PF 0.005 %
INSTILL 1 DROP INTO AFFECTED EYE(S) BY OPHTHALMIC ROUTE ONCE DAILY IN THE EVENING DROPS OPHTHALMIC (EYE) 1
Qty: 1 | Refills: 0 | Status: ACTIVE | COMMUNITY
Start: 1900-01-01

## 2025-03-04 RX ORDER — POTASSIUM CHLORIDE 750 MG/1
TAKE 1 CAPSULE (10 MEQ) BY ORAL ROUTE ONCE DAILY WITH FOOD CAPSULE, EXTENDED RELEASE ORAL 1
Qty: 0 | Refills: 0 | Status: ACTIVE | COMMUNITY
Start: 1900-01-01

## 2025-03-04 RX ORDER — SUCRALFATE 1 G/1
TAKE 1 TABLET BY MOUTH FOUR TIMES A DAY WITH A GLASS OF WATER DISSOLVE ONE TAB IN 10ML OF WATER BEFORE MEALS AND DRINK TABLET ORAL
Status: ACTIVE | COMMUNITY
Start: 2022-04-27

## 2025-03-04 RX ORDER — NYSTATIN 100000 [USP'U]/G
CREAM TOPICAL
Qty: 0 | Refills: 0 | Status: ACTIVE | COMMUNITY
Start: 1900-01-01

## 2025-03-04 RX ORDER — CHLORHEXIDINE GLUCONATE 4 %
LIQUID (ML) TOPICAL
Qty: 0 | Refills: 0 | Status: ACTIVE | COMMUNITY
Start: 1900-01-01

## 2025-03-04 RX ORDER — PANTOPRAZOLE SODIUM 40 MG/1
TABLET, DELAYED RELEASE ORAL
Qty: 0 | Refills: 0 | Status: ACTIVE | COMMUNITY
Start: 1900-01-01

## 2025-03-04 RX ORDER — ESCITALOPRAM 10 MG/1
TABLET, FILM COATED ORAL
Qty: 0 | Refills: 0 | Status: ACTIVE | COMMUNITY
Start: 1900-01-01

## 2025-03-05 ENCOUNTER — DASHBOARD ENCOUNTERS (OUTPATIENT)
Age: 75
End: 2025-03-05

## 2025-03-06 ENCOUNTER — OFFICE VISIT (OUTPATIENT)
Dept: URBAN - METROPOLITAN AREA CLINIC 84 | Facility: CLINIC | Age: 75
End: 2025-03-06

## 2025-03-06 RX ORDER — LATANOPROST/PF 0.005 %
INSTILL 1 DROP INTO AFFECTED EYE(S) BY OPHTHALMIC ROUTE ONCE DAILY IN THE EVENING DROPS OPHTHALMIC (EYE) 1
Qty: 1 | Refills: 0 | Status: ACTIVE | COMMUNITY
Start: 1900-01-01

## 2025-03-06 RX ORDER — CHLORHEXIDINE GLUCONATE 4 %
LIQUID (ML) TOPICAL
Qty: 0 | Refills: 0 | Status: ACTIVE | COMMUNITY
Start: 1900-01-01

## 2025-03-06 RX ORDER — SODIUM SULFATE, POTASSIUM SULFATE, MAGNESIUM SULFATE 17.5; 3.13; 1.6 G/ML; G/ML; G/ML
TAKE AS DIRECTED MIX AND DRINK AS DIRECTED FOR COLONOSCOPY PREP SOLUTION, CONCENTRATE ORAL
Status: ACTIVE | COMMUNITY
Start: 2022-03-24

## 2025-03-06 RX ORDER — ESCITALOPRAM 10 MG/1
TABLET, FILM COATED ORAL
Qty: 0 | Refills: 0 | Status: ACTIVE | COMMUNITY
Start: 1900-01-01

## 2025-03-06 RX ORDER — SUCRALFATE 1 G/1
TAKE 1 TABLET BY MOUTH FOUR TIMES A DAY WITH A GLASS OF WATER DISSOLVE ONE TAB IN 10ML OF WATER BEFORE MEALS AND DRINK TABLET ORAL
Status: ACTIVE | COMMUNITY
Start: 2022-04-27

## 2025-03-06 RX ORDER — DICLOFENAC SODIUM 1 MG/ML
SOLUTION OPHTHALMIC
Qty: 0 | Refills: 0 | Status: ACTIVE | COMMUNITY
Start: 1900-01-01

## 2025-03-06 RX ORDER — PANTOPRAZOLE SODIUM 40 MG/1
TABLET, DELAYED RELEASE ORAL
Qty: 0 | Refills: 0 | Status: ACTIVE | COMMUNITY
Start: 1900-01-01

## 2025-03-06 RX ORDER — POTASSIUM CHLORIDE 750 MG/1
TAKE 1 CAPSULE (10 MEQ) BY ORAL ROUTE ONCE DAILY WITH FOOD CAPSULE, EXTENDED RELEASE ORAL 1
Qty: 0 | Refills: 0 | Status: ACTIVE | COMMUNITY
Start: 1900-01-01

## 2025-03-06 RX ORDER — NYSTATIN 100000 [USP'U]/G
CREAM TOPICAL
Qty: 0 | Refills: 0 | Status: ACTIVE | COMMUNITY
Start: 1900-01-01

## 2025-03-20 ENCOUNTER — OFFICE VISIT (OUTPATIENT)
Dept: URBAN - METROPOLITAN AREA CLINIC 84 | Facility: CLINIC | Age: 75
End: 2025-03-20